# Patient Record
Sex: MALE | Race: WHITE | URBAN - METROPOLITAN AREA
[De-identification: names, ages, dates, MRNs, and addresses within clinical notes are randomized per-mention and may not be internally consistent; named-entity substitution may affect disease eponyms.]

---

## 2017-12-11 ENCOUNTER — OFFICE VISIT (OUTPATIENT)
Dept: DERMATOLOGY | Age: 52
End: 2017-12-11

## 2017-12-11 DIAGNOSIS — D22.9 MULTIPLE NEVI: Primary | ICD-10-CM

## 2017-12-11 DIAGNOSIS — I78.1 TELANGIECTASIA: ICD-10-CM

## 2017-12-11 PROCEDURE — 99213 OFFICE O/P EST LOW 20 MIN: CPT | Performed by: DERMATOLOGY

## 2017-12-11 NOTE — PROGRESS NOTES
[facility]  Willma Cooks, MD  [phone]      Pamela Cosme  1965    47 y.o. male     Date of Visit: 12/11/2017    Chief Complaint: moles/lesions  Chief Complaint   Patient presents with    Skin Exam     Last seen: 3-2016 for laser    History of Present Illness:    1. Here for evaluation of multiple asx pigmented lesions on the trunk and extremities, present for many years; no change in size/shape/color of any lesions; no bleeding lesions. 2. S/p Vbeam for Veins on the nasal tip with significant improvement/clearance. Hx of extensive sun exposure as a teen (laid out a lot to help facial acne, UV trx in office and tanning beds). No personal hx of skin cancer. Review of Systems:  Gen: Feels well, good sense of health. Skin: No changing moles or lesions. Past Medical History, Family History, Surgical History, Medications and Allergies reviewed. History reviewed. No pertinent past medical history. Past Surgical History:   Procedure Laterality Date    KNEE SURGERY         Outpatient Prescriptions Marked as Taking for the 12/11/17 encounter (Office Visit) with Matthieu Rizzo MD   Medication Sig Dispense Refill    lisinopril-hydrochlorothiazide (PRINZIDE;ZESTORETIC) 10-12.5 MG per tablet       simvastatin (ZOCOR) 20 MG tablet          No Known Allergies      Physical Examination     Gen, well-appearing  The following were examined and determined to be normal: Psych/Neuro, Scalp/hair, Conjunctivae/eyelids, Gums/teeth/lips, Breast/axilla/chest, Abdomen, Back, RUE, LUE, RLE, LLE, Nails/digits and buttocks. Head/face and Neck. The following were examined and determined to be abnormal: none  trunk and extremities with scattered brown macules and papules   Nose still nearly clear - few very fine subtle telangiectasias    Assessment and Plan     1.  Benign-appearing nevi  - educ re ABCD's of MM   educ sun protection   encouraged skin check yearly (sooner if indicated), self checks    2.  Telangiectasias - markedly improved

## 2021-11-29 ENCOUNTER — TELEPHONE (OUTPATIENT)
Dept: DERMATOLOGY | Age: 56
End: 2021-11-29

## 2021-11-29 NOTE — TELEPHONE ENCOUNTER
Patient is requesting a return call to schedule appt for a mole on collar bone that is light brownish gray, itchy for about 2-3 weeks. [phone]. Patient was last seen 2017. Please advise. Thank you!

## 2021-11-30 ENCOUNTER — OFFICE VISIT (OUTPATIENT)
Dept: DERMATOLOGY | Age: 56
End: 2021-11-30
Payer: COMMERCIAL

## 2021-11-30 VITALS — TEMPERATURE: 97.2 F

## 2021-11-30 DIAGNOSIS — D22.9 MULTIPLE NEVI: Primary | ICD-10-CM

## 2021-11-30 DIAGNOSIS — L81.4 LENTIGINES: ICD-10-CM

## 2021-11-30 DIAGNOSIS — L82.0 INFLAMED SEBORRHEIC KERATOSIS: ICD-10-CM

## 2021-11-30 PROCEDURE — 17110 DESTRUCTION B9 LES UP TO 14: CPT | Performed by: DERMATOLOGY

## 2021-11-30 PROCEDURE — 99203 OFFICE O/P NEW LOW 30 MIN: CPT | Performed by: DERMATOLOGY

## 2021-11-30 NOTE — TELEPHONE ENCOUNTER
Called patient to work in on a cancellation for today at 3:15. LM to call back if able to take, and still available when patient calls back.

## 2021-11-30 NOTE — PROGRESS NOTES
[facility]  Tee Barnes MD  [phone]      Pepper Bonds  1965    55 y.o. male     Date of Visit: 11/30/2021    Chief Complaint: moles/lesions  Chief Complaint   Patient presents with    Skin Lesion     spot on right collar bone     Last seen:   *wife and daughter are patients as well    History of Present Illness:    1. He notes a changing and pruritic lesion on the right collarbone that he has noticed recently. 2. Here for evaluation of multiple asx pigmented lesions on the trunk and extremities, present for many years; no change in size/shape/color of any lesions; no bleeding lesions. Vbeam for Veins on the nasal tip with significant improvement/clearance in the past.    Hx of extensive sun exposure as a teen (laid out a lot to help facial acne, UV trx in office and tanning beds). No personal hx of skin cancer. Review of Systems:  Gen: Feels well, good sense of health. Skin: No changing moles or lesions. Past Medical History, Family History, Surgical History, Medications and Allergies reviewed. No past medical history on file. Past Surgical History:   Procedure Laterality Date    KNEE SURGERY         Outpatient Medications Marked as Taking for the 11/30/21 encounter (Office Visit) with Gagan Thompson MD   Medication Sig Dispense Refill    lisinopril-hydrochlorothiazide (PRINZIDE;ZESTORETIC) 10-12.5 MG per tablet       simvastatin (ZOCOR) 20 MG tablet          No Known Allergies      Physical Examination     Gen, well-appearing  FSE today    trunk and extremities with scattered brown macules and papules   Right clavicular area with dull peripherally pink-tan papule  Nose still nearly clear - few very fine subtle telangiectasias    Assessment and Plan     1. Benign-appearing nevi and lentigines  - educ re ABCD's of MM   educ sun protection SPF 30+  encouraged skin check yearly (sooner if indicated), self checks    2.  ISK - R clavicle  - 1 lesion(s) treated with liquid nitrogen x 2 cycles. Patient educated on risk of blister, hypopigmentation/scar and wound care.       Telangiectasias - markedly improved

## 2021-11-30 NOTE — PATIENT INSTRUCTIONS
Protecting Yourself From the Sun    · Apply an over-the-counter broad spectrum water resistant sunscreen with an SPF of at least 30 to exposed areas of the skin. Dont forget the ears and lips! Remember to reapply sunscreen about every 2 hours and after swimming or sweating. · Wear sun protective clothing. Swim shirts (aka. rash guards) are a great idea and negates the need to reapply sunscreen in those areas. · Seek the shade whenever possible especially between the hours of 10 am and 4 pm when the suns rays are the strongest.     · Avoid tanning beds      Cryosurgery (Freezing) Wound Care Instructions    AFTER THE PROCEDURE:    You will notice swelling and redness around the site. This is normal.    You may experience a sharp or sore feeling for the next several days. For this discomfort, you may take acetaminophen (Tylenol©).  A blister may develop at the treated area, sometimes as soon as by the end of the day. After several days, the blister will subside and a scab will form.  If the area is bumped or traumatized during the first few days following freezing, you may develop bleeding into the blister, forming a blood blister. This is nothing to be alarmed about.  If the blister is tense, uncomfortable, or much larger than the site that was frozen, you may pop the blister along its edge with a sterile needle (boiled, heated under a flame, or cleaned with alcohol) to allow the fluid to drain out. If the blister does not bother you, no treatment is needed.  Do NOT peel off the top of the blister roof. It will act as a dressing on top of your wound. WOUND CARE:    You may shower or bathe as usual, but avoid scrubbing the areas that have been frozen.  Cleanse the site twice a day with mild soapy water, and then apply a thin film of white petrolatum (Vaseline©).  You do not need to cover the area, but can if you prefer.     Do NOT allow the site to become dry or crusted, or attempt to

## 2022-12-19 ENCOUNTER — OFFICE VISIT (OUTPATIENT)
Dept: DERMATOLOGY | Age: 57
End: 2022-12-19
Payer: COMMERCIAL

## 2022-12-19 DIAGNOSIS — I78.1 TELANGIECTASIA: ICD-10-CM

## 2022-12-19 DIAGNOSIS — D48.5 NEOPLASM OF UNCERTAIN BEHAVIOR OF SKIN: Primary | ICD-10-CM

## 2022-12-19 PROCEDURE — DM01300 VBEAM LASER EXTRA SMALL AREA: Performed by: DERMATOLOGY

## 2022-12-19 PROCEDURE — 11104 PUNCH BX SKIN SINGLE LESION: CPT | Performed by: DERMATOLOGY

## 2022-12-19 NOTE — PROGRESS NOTES
[facility]  Colton Craven MD  [phone]      Isadora Lerma  1965    65 y.o. male     Date of Visit: 12/19/2022    Chief Complaint: lesion, veins  Chief Complaint   Patient presents with    Cyst     Right upper back     Last seen:   *wife and daughter are patients as well    History of Present Illness:    1. Here for a persistent irritated bump on the R upper back. 2. Vbeam for Veins on the nasal tip with significant improvement/clearance in the past.  Last treated in 2016. Starting to get new/recurrent veins. Hx of extensive sun exposure as a teen (laid out a lot to help facial acne, UV trx in office and tanning beds). No personal hx of skin cancer. Review of Systems:  Gen: Feels well, good sense of health. Past Medical History, Family History, Surgical History, Medications and Allergies reviewed. History reviewed. No pertinent past medical history. Past Surgical History:   Procedure Laterality Date    KNEE SURGERY         Outpatient Medications Marked as Taking for the 12/19/22 encounter (Office Visit) with Sharath Ray MD   Medication Sig Dispense Refill    lisinopril-hydrochlorothiazide (PRINZIDE;ZESTORETIC) 10-12.5 MG per tablet       simvastatin (ZOCOR) 20 MG tablet          No Known Allergies      Physical Examination     Gen, well-appearing    R upper back with small cystic skin-colored papule  Ala > rest of the nasal tip with telangiectasias    Assessment and Plan     1. R/o epidermoid cyst - R upper back  - Punch biopsy performed after verbal consent obtained. Patient educated regarding risk of bleeding, infection, scar and educated on wound care. Skin cleansed with alcohol pad and site anesthetized with lido + epi. Aluminum chloride applied to site for hemostasis if necessary and sutures placed. Petrolatum ointment and bandage applied. Specimen bottle labeled with patient information and site and specimen sent to [facility].       2. Telangiectasias - ala  - Vbeam today  100    Laser Procedure Note       Brunondbeulah Fox   YOB: 1965    DATE OF VISIT:  12/19/2022  Chief Complaint   Patient presents with    Cyst     Right upper back     LASER: Vbeam  DIAGNOSIS:  No diagnosis found. We discussed treatment options, including no treatment as well as the following:  - need for multiple treatments and risk of incomplete clearance, recurrence  - risk of erythema, edema, purpura, dyspigmentation and scarring    PATIENT IDENTIFIED PER PROTOCOL: yes  LOCATION(S): face  VERIFIED AND MARKED: yes  TECHNIQUES, RISKS, BENEFITS AND ALTERNATIVES EXPLAINED: yes  CONSENT SIGNED, WITNESSED AND DATED: yes        OPERATIVE REPORT    DIAGNOSIS, LOCATION, PROCEDURE RECONFIRMED: yes   APPROPRIATE EYE PROTECTION for PATIENT: yes  APPROPRIATE EYE PROTECTION for PROVIDER and OTHERS PRESENT: yes  ANESTHESIA/PRE-OP MEDICATIONS: none  LASER SETTINGS:  (1)  WAVELENGTH: 595  LENS: 3x10  FLUENCE: 12.5  PULSE DURATION: 10  COOLING: off   (2)  WAVELENGTH: 595  LENS: 10  FLUENCE: 7.5  PULSE DURATION: 10  COOLING: off     PROCEDURE NOTE:  Individual telangiectasias treated with setting 1 - single or dbl pulses with clearance and then nose treated with setting 2.     POST-OPERATIVE CARE/DISPOSITION: ice pack  COMPLICATIONS: none  MEDICATIONS: none  WOUND CARE INSTRUCTIONS PROVIDED: yes    100

## 2022-12-19 NOTE — PATIENT INSTRUCTIONS
Biopsy Wound Care Instructions    Keep the bandage in place for 24 hours. Cleanse the wound with mild soapy water daily  Gently dry the area. Apply Vaseline or petroleum jelly to the wound using a cotton tipped applicator. Cover with a clean bandage. Repeat this process until the biopsy site is healed. If you had stitches placed, continue treating the site until the stitches are removed in about 12 days. Remember to make an appointment to return to have your stitches removed by our staff. You may shower and bathe as usual.       ** Biopsy results generally take around 7 business days to come back. If you have not heard from us by then, please call the office at [phone]. *Please note that biopsy results are released to both the patient and physician at the same time in [address] Please allow time for your physician to review the results. One of our staff members will reach out to you with the results and plan.

## 2022-12-22 LAB — DERMATOLOGY PATHOLOGY REPORT: NORMAL

## 2023-01-03 ENCOUNTER — TELEPHONE (OUTPATIENT)
Dept: DERMATOLOGY | Age: 58
End: 2023-01-03

## 2023-01-03 NOTE — TELEPHONE ENCOUNTER
Pt had a procedure done 2 weeks ago. He is supposed to have a family member help him get the stitches out. He says that one of the stitches is embedded a little. The pt's wife does not feel comfortable taking it out. He is asking if he can come into the office and have someone take it out?     His phone number is [phone]

## 2023-01-05 ENCOUNTER — NURSE ONLY (OUTPATIENT)
Dept: DERMATOLOGY | Age: 58
End: 2023-01-05

## 2023-01-05 DIAGNOSIS — Z48.02 VISIT FOR SUTURE REMOVAL: Primary | ICD-10-CM

## 2023-01-05 PROCEDURE — 99024 POSTOP FOLLOW-UP VISIT: CPT | Performed by: DERMATOLOGY

## 2023-01-05 NOTE — PROGRESS NOTES
Patient presents for suture removal, wife did attempt at home. The wound is dark pink, pt denies pain or fever/chills. The sutures are removed. Pt was seen by MD. Wound care and activity instructions given. Pt will call office, if needed. Pt verbalized an understanding.

## 2023-10-02 ENCOUNTER — OFFICE VISIT (OUTPATIENT)
Dept: DERMATOLOGY | Age: 58
End: 2023-10-02
Payer: COMMERCIAL

## 2023-10-02 DIAGNOSIS — L65.9 ALOPECIA: Primary | ICD-10-CM

## 2023-10-02 PROCEDURE — 99214 OFFICE O/P EST MOD 30 MIN: CPT | Performed by: DERMATOLOGY

## 2023-10-02 RX ORDER — FINASTERIDE 1 MG/1
TABLET, FILM COATED ORAL
Qty: 30 TABLET | Refills: 3 | Status: SHIPPED | OUTPATIENT
Start: 2023-10-02

## 2023-10-02 NOTE — PROGRESS NOTES
[facility]  Umesh Faye MD  [phone]      Maia Hayden  1965    63 y.o. male     Date of Visit: 10/2/2023    Chief Complaint: moles/lesions  Chief Complaint   Patient presents with    Skin Lesion     Last seen:   *wife and daughter are patients as well    History of Present Illness:    Here for hair loss/thinning - more significant over the past year but started several years ago. No other trx tried so far. CBC wnl 5-2023  TSH wnl 5-2023  Renal wnl 5-2023    No med changes. No surgeries. No hair loss elsewhere - only scalp - mainly vertex and frontal hairline. Wife with hx of alopecia areata - resolved with ILK. Vbeam for Veins on the nasal tip with significant improvement/clearance in the past.    Hx of extensive sun exposure as a teen (laid out a lot to help facial acne, UV trx in office and tanning beds). No personal hx of skin cancer. Review of Systems:  Gen: Feels well, good sense of health. Past Medical History, Family History, Surgical History, Medications and Allergies reviewed. History reviewed. No pertinent past medical history.     Past Surgical History:   Procedure Laterality Date    KNEE SURGERY         Outpatient Medications Marked as Taking for the 10/2/23 encounter (Office Visit) with Florentin Walls MD   Medication Sig Dispense Refill    lisinopril-hydrochlorothiazide (PRINZIDE;ZESTORETIC) 10-12.5 MG per tablet       simvastatin (ZOCOR) 20 MG tablet          No Known Allergies      Physical Examination     Gen, well-appearing    Regression of frontal/temporal hairline with thinning of the vertex/crown of the scalp  Occipital scalp normal density  Neg hair pull  No completely alopecic areas    Assessment and Plan     Hair loss, c/w AGA  - discussed etiology and potential for progression, chronicity  - labs reviewed  - Discussed treatment options including topical and systemic treatment as well as other less traditional options

## 2024-01-23 ENCOUNTER — OFFICE VISIT (OUTPATIENT)
Dept: DERMATOLOGY | Age: 59
End: 2024-01-23
Payer: COMMERCIAL

## 2024-01-23 DIAGNOSIS — D48.5 NEOPLASM OF UNCERTAIN BEHAVIOR OF SKIN: ICD-10-CM

## 2024-01-23 DIAGNOSIS — L65.9 ALOPECIA: ICD-10-CM

## 2024-01-23 DIAGNOSIS — B95.8 STAPH INFECTION: ICD-10-CM

## 2024-01-23 DIAGNOSIS — L81.4 LENTIGINES: ICD-10-CM

## 2024-01-23 DIAGNOSIS — D22.9 MULTIPLE NEVI: Primary | ICD-10-CM

## 2024-01-23 PROCEDURE — 99214 OFFICE O/P EST MOD 30 MIN: CPT | Performed by: DERMATOLOGY

## 2024-01-23 PROCEDURE — 11102 TANGNTL BX SKIN SINGLE LES: CPT | Performed by: DERMATOLOGY

## 2024-01-23 NOTE — PATIENT INSTRUCTIONS
Biopsy Wound Care Instructions    Keep the bandage in place for 24 hours.   Cleanse the wound with mild soapy water daily  Gently dry the area.  Apply Vaseline or petroleum jelly to the wound using a cotton tipped applicator.  Cover with a clean bandage.  Repeat this process until the biopsy site is healed.  If you had stitches placed, continue treating the site until the stitches are removed. Remember to make an appointment to return to have your stitches removed by our staff.  You may shower and bathe as usual.       ** Biopsy results generally take around 7 business days to come back.  If you have not heard from us by then, please call the office at [phone].    *Please note that biopsy results are released to both the patient and physician at the same time in [facility].  Please allow time for your physician to review the results.  One of our staff members will reach out to you with the results and plan.

## 2024-01-23 NOTE — PROGRESS NOTES
[facility]  Mayra Grace MD  [phone]      Rocco Kennedy  1965    58 y.o. male     Date of Visit: 1/23/2024    Chief Complaint: moles/lesions, f/u alopecia  Chief Complaint   Patient presents with    Skin Exam     Last seen:   *wife and daughter are patients as well    History of Present Illness:    1.  Here for evaluation of multiple asx pigmented lesions on the trunk and extremities, present for many years; no change in size/shape/color of any lesions; no bleeding lesions.    2. He has a concerning pigmented lesion on the R mid/lower back.  Asx.  He was not aware of it.    3. F/u for hair loss/thinning - seen fall 2023 and thought to be c/w AGA - more significant over the past year but started several years ago.  Has rec minoxidil topically + finasteride but decided not to start.  Seems better spontaneously since last seen and now concerned could have been more stress related.    CBC wnl 5-2023  TSH wnl 5-2023  Renal wnl 5-2023    No med changes.  No surgeries.  No hair loss elsewhere - only scalp - mainly vertex and frontal hairline.    Wife with hx of alopecia areata - resolved with ILK.    4. C/o irritation in the nares as well as intermittent pustules x many mos  - too dry with trying BP; no other trx tried  - uses a sinus rinse    Vbeam for Veins on the nasal tip with significant improvement/clearance in the past.    Hx of extensive sun exposure as a teen (laid out a lot to help facial acne, UV trx in office and tanning beds).  No personal hx of skin cancer.    Review of Systems:  Gen: Feels well, good sense of health.  Skin: No changing moles or lesions.    Past Medical History, Family History, Surgical History, Medications and Allergies reviewed.    History reviewed. No pertinent past medical history.    Past Surgical History:   Procedure Laterality Date    KNEE SURGERY         Outpatient Medications Marked as Taking for the 1/23/24 encounter (Office Visit) with

## 2024-01-26 LAB
BACTERIA SPEC AEROBE CULT: ABNORMAL
BACTERIA SPEC AEROBE CULT: ABNORMAL
GRAM STN SPEC: ABNORMAL
ORGANISM: ABNORMAL

## 2024-01-29 ENCOUNTER — TELEPHONE (OUTPATIENT)
Dept: DERMATOLOGY | Age: 59
End: 2024-01-29

## 2024-01-29 NOTE — TELEPHONE ENCOUNTER
Pt calling to speak to Basilia regarding biopsy results pls return call back @ [phone] to discuss

## 2024-01-30 ENCOUNTER — TELEPHONE (OUTPATIENT)
Dept: DERMATOLOGY | Age: 59
End: 2024-01-30

## 2024-01-31 NOTE — TELEPHONE ENCOUNTER
Lower back - Irritated and mildly dysplastic/atypical nevus, completely removed with biopsy.  No further treatment is needed at this time.  Please call if any concerns, particularly if pain, bleeding or new lesion growing.

## 2024-10-25 ENCOUNTER — TELEPHONE (OUTPATIENT)
Dept: FAMILY MEDICINE CLINIC | Age: 59
End: 2024-10-25

## 2024-10-25 NOTE — TELEPHONE ENCOUNTER
----- Message from Flory AMES sent at 10/25/2024  9:12 AM EDT -----  Regarding: [facility] Appointment Request  [facility] Appointment Request    Patient needs appointment for [facility] Appointment Type: New to Provider.    Patient Requested Dates(s): Any day   Patient Requested Time: Any time   Provider Name: Esperanza Tyler MD    Note: The patient is looking for a primary care provider preferably Esperanza Tyler MD to be an establish patient under her care.     Reason for Appointment Request: Established Patient - No appointments available during search  --------------------------------------------------------------------------------------------------------------------------    Relationship to Patient: Self     Call Back Information: OK to leave message on voicemail  Preferred Call Back Number: Phone [phone]

## 2024-10-25 NOTE — TELEPHONE ENCOUNTER
Pts wife is established with Dr. Tyler confirmed with information. She would like to see if Dr. Tyler will take on her  as a NP.    Please advise

## 2024-10-28 NOTE — TELEPHONE ENCOUNTER
Patient scheduled.    Recent Visits  No visits were found meeting these conditions.  Showing recent visits within past 540 days with a meds authorizing provider and meeting all other requirements  Future Appointments  Date Type Provider Dept   10/29/24 Appointment Esperanza Tyler MD [facility]   Showing future appointments within next 150 days with a meds authorizing provider and meeting all other requirements

## 2024-10-29 ENCOUNTER — OFFICE VISIT (OUTPATIENT)
Dept: FAMILY MEDICINE CLINIC | Age: 59
End: 2024-10-29
Payer: COMMERCIAL

## 2024-10-29 VITALS
HEART RATE: 59 BPM | DIASTOLIC BLOOD PRESSURE: 90 MMHG | HEIGHT: 66 IN | TEMPERATURE: 97.5 F | OXYGEN SATURATION: 96 % | WEIGHT: 192 LBS | SYSTOLIC BLOOD PRESSURE: 140 MMHG | RESPIRATION RATE: 16 BRPM | BODY MASS INDEX: 30.86 KG/M2

## 2024-10-29 DIAGNOSIS — Z00.01 ENCOUNTER FOR ROUTINE ADULT HEALTH EXAMINATION WITH ABNORMAL FINDINGS: Primary | ICD-10-CM

## 2024-10-29 DIAGNOSIS — E78.49 OTHER HYPERLIPIDEMIA: ICD-10-CM

## 2024-10-29 DIAGNOSIS — G47.33 OBSTRUCTIVE SLEEP APNEA: ICD-10-CM

## 2024-10-29 DIAGNOSIS — Z12.11 SCREENING FOR COLON CANCER: ICD-10-CM

## 2024-10-29 DIAGNOSIS — I10 ESSENTIAL HYPERTENSION: ICD-10-CM

## 2024-10-29 DIAGNOSIS — Z23 NEED FOR VACCINATION: ICD-10-CM

## 2024-10-29 LAB
ALBUMIN SERPL-MCNC: 4.7 G/DL (ref 3.4–5)
ALBUMIN/GLOB SERPL: 1.8 {RATIO} (ref 1.1–2.2)
ALP SERPL-CCNC: 88 U/L (ref 40–129)
ALT SERPL-CCNC: 34 U/L (ref 10–40)
ANION GAP SERPL CALCULATED.3IONS-SCNC: 10 MMOL/L (ref 3–16)
AST SERPL-CCNC: 28 U/L (ref 15–37)
BILIRUB SERPL-MCNC: 1.4 MG/DL (ref 0–1)
BUN SERPL-MCNC: 18 MG/DL (ref 7–20)
CALCIUM SERPL-MCNC: 9.7 MG/DL (ref 8.3–10.6)
CHLORIDE SERPL-SCNC: 99 MMOL/L (ref 99–110)
CHOLEST SERPL-MCNC: 292 MG/DL (ref 0–199)
CO2 SERPL-SCNC: 29 MMOL/L (ref 21–32)
CREAT SERPL-MCNC: 0.9 MG/DL (ref 0.9–1.3)
CREAT UR-MCNC: 60.5 MG/DL (ref 39–259)
CRP SERPL HS-MCNC: 1.12 MG/L (ref 0.16–3)
EST. AVERAGE GLUCOSE BLD GHB EST-MCNC: 111.2 MG/DL
GFR SERPLBLD CREATININE-BSD FMLA CKD-EPI: >90 ML/MIN/{1.73_M2}
GLUCOSE SERPL-MCNC: 104 MG/DL (ref 70–99)
HBA1C MFR BLD: 5.5 %
HDLC SERPL-MCNC: 49 MG/DL (ref 40–60)
LDLC SERPL CALC-MCNC: 210 MG/DL
MICROALBUMIN UR DL<=1MG/L-MCNC: <1.2 MG/DL
MICROALBUMIN/CREAT UR: NORMAL MG/G (ref 0–30)
POTASSIUM SERPL-SCNC: 4.4 MMOL/L (ref 3.5–5.1)
PROT SERPL-MCNC: 7.3 G/DL (ref 6.4–8.2)
SODIUM SERPL-SCNC: 138 MMOL/L (ref 136–145)
TRIGL SERPL-MCNC: 164 MG/DL (ref 0–150)
VLDLC SERPL CALC-MCNC: 33 MG/DL

## 2024-10-29 PROCEDURE — 3080F DIAST BP >= 90 MM HG: CPT | Performed by: FAMILY MEDICINE

## 2024-10-29 PROCEDURE — 90661 CCIIV3 VAC ABX FR 0.5 ML IM: CPT | Performed by: FAMILY MEDICINE

## 2024-10-29 PROCEDURE — 3077F SYST BP >= 140 MM HG: CPT | Performed by: FAMILY MEDICINE

## 2024-10-29 PROCEDURE — 1000F TOBACCO USE ASSESSED: CPT | Performed by: FAMILY MEDICINE

## 2024-10-29 PROCEDURE — 90471 IMMUNIZATION ADMIN: CPT | Performed by: FAMILY MEDICINE

## 2024-10-29 PROCEDURE — 90480 ADMN SARSCOV2 VAC 1/ONLY CMP: CPT | Performed by: FAMILY MEDICINE

## 2024-10-29 PROCEDURE — 99203 OFFICE O/P NEW LOW 30 MIN: CPT | Performed by: FAMILY MEDICINE

## 2024-10-29 PROCEDURE — 99386 PREV VISIT NEW AGE 40-64: CPT | Performed by: FAMILY MEDICINE

## 2024-10-29 PROCEDURE — 91320 SARSCV2 VAC 30MCG TRS-SUC IM: CPT | Performed by: FAMILY MEDICINE

## 2024-10-29 RX ORDER — SIMVASTATIN 20 MG
20 TABLET ORAL NIGHTLY
Qty: 90 TABLET | Refills: 3 | Status: SHIPPED | OUTPATIENT
Start: 2024-10-29 | End: 2024-10-30 | Stop reason: ALTCHOICE

## 2024-10-29 RX ORDER — LISINOPRIL AND HYDROCHLOROTHIAZIDE 10; 12.5 MG/1; MG/1
1 TABLET ORAL DAILY
Qty: 90 TABLET | Refills: 3 | Status: SHIPPED | OUTPATIENT
Start: 2024-10-29

## 2024-10-29 SDOH — ECONOMIC STABILITY: INCOME INSECURITY: HOW HARD IS IT FOR YOU TO PAY FOR THE VERY BASICS LIKE FOOD, HOUSING, MEDICAL CARE, AND HEATING?: NOT HARD AT ALL

## 2024-10-29 SDOH — ECONOMIC STABILITY: FOOD INSECURITY: WITHIN THE PAST 12 MONTHS, YOU WORRIED THAT YOUR FOOD WOULD RUN OUT BEFORE YOU GOT MONEY TO BUY MORE.: NEVER TRUE

## 2024-10-29 SDOH — ECONOMIC STABILITY: FOOD INSECURITY: WITHIN THE PAST 12 MONTHS, THE FOOD YOU BOUGHT JUST DIDN'T LAST AND YOU DIDN'T HAVE MONEY TO GET MORE.: NEVER TRUE

## 2024-10-29 ASSESSMENT — PATIENT HEALTH QUESTIONNAIRE - PHQ9
SUM OF ALL RESPONSES TO PHQ9 QUESTIONS 1 & 2: 0
1. LITTLE INTEREST OR PLEASURE IN DOING THINGS: NOT AT ALL
SUM OF ALL RESPONSES TO PHQ QUESTIONS 1-9: 0
2. FEELING DOWN, DEPRESSED OR HOPELESS: NOT AT ALL
SUM OF ALL RESPONSES TO PHQ QUESTIONS 1-9: 0
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
## 2024-10-30 DIAGNOSIS — E78.49 OTHER HYPERLIPIDEMIA: Primary | ICD-10-CM

## 2024-10-30 RX ORDER — ATORVASTATIN CALCIUM 40 MG/1
40 TABLET, FILM COATED ORAL DAILY
Qty: 90 TABLET | Refills: 3 | Status: SHIPPED | OUTPATIENT
Start: 2024-10-30

## 2024-11-01 LAB — LPA SERPL-MCNC: 32 MG/DL

## 2025-03-04 ENCOUNTER — TELEPHONE (OUTPATIENT)
Dept: FAMILY MEDICINE CLINIC | Age: 60
End: 2025-03-04

## 2025-03-04 NOTE — TELEPHONE ENCOUNTER
Pt called in and wanted to know based on an article he read online if he should get a booster of the MMR and Polio vaccines due his age.    Please advise and thank you